# Patient Record
Sex: MALE | Race: ASIAN | NOT HISPANIC OR LATINO | URBAN - METROPOLITAN AREA
[De-identification: names, ages, dates, MRNs, and addresses within clinical notes are randomized per-mention and may not be internally consistent; named-entity substitution may affect disease eponyms.]

---

## 2022-06-10 ENCOUNTER — EMERGENCY (EMERGENCY)
Facility: HOSPITAL | Age: 22
LOS: 1 days | Discharge: HOME | End: 2022-06-10
Attending: EMERGENCY MEDICINE | Admitting: EMERGENCY MEDICINE
Payer: COMMERCIAL

## 2022-06-10 VITALS
OXYGEN SATURATION: 100 % | SYSTOLIC BLOOD PRESSURE: 102 MMHG | RESPIRATION RATE: 16 BRPM | TEMPERATURE: 98 F | DIASTOLIC BLOOD PRESSURE: 62 MMHG | HEART RATE: 71 BPM

## 2022-06-10 VITALS
RESPIRATION RATE: 18 BRPM | SYSTOLIC BLOOD PRESSURE: 108 MMHG | TEMPERATURE: 97 F | OXYGEN SATURATION: 100 % | DIASTOLIC BLOOD PRESSURE: 63 MMHG | HEART RATE: 70 BPM

## 2022-06-10 DIAGNOSIS — F32.A DEPRESSION, UNSPECIFIED: ICD-10-CM

## 2022-06-10 LAB
ALBUMIN SERPL ELPH-MCNC: 5.1 G/DL — HIGH (ref 3.3–5)
ALP SERPL-CCNC: 63 U/L — SIGNIFICANT CHANGE UP (ref 60–270)
ALT FLD-CCNC: 21 U/L — SIGNIFICANT CHANGE UP (ref 4–41)
ANION GAP SERPL CALC-SCNC: 12 MMOL/L — SIGNIFICANT CHANGE UP (ref 7–14)
APPEARANCE UR: CLEAR — SIGNIFICANT CHANGE UP
AST SERPL-CCNC: 15 U/L — SIGNIFICANT CHANGE UP (ref 4–40)
B PERT DNA SPEC QL NAA+PROBE: SIGNIFICANT CHANGE UP
B PERT+PARAPERT DNA PNL SPEC NAA+PROBE: SIGNIFICANT CHANGE UP
BASOPHILS # BLD AUTO: 0.05 K/UL — SIGNIFICANT CHANGE UP (ref 0–0.2)
BASOPHILS NFR BLD AUTO: 0.9 % — SIGNIFICANT CHANGE UP (ref 0–2)
BILIRUB SERPL-MCNC: 0.6 MG/DL — SIGNIFICANT CHANGE UP (ref 0.2–1.2)
BILIRUB UR-MCNC: NEGATIVE — SIGNIFICANT CHANGE UP
BORDETELLA PARAPERTUSSIS (RAPRVP): SIGNIFICANT CHANGE UP
BUN SERPL-MCNC: 17 MG/DL — SIGNIFICANT CHANGE UP (ref 7–23)
C PNEUM DNA SPEC QL NAA+PROBE: SIGNIFICANT CHANGE UP
CALCIUM SERPL-MCNC: 9.6 MG/DL — SIGNIFICANT CHANGE UP (ref 8.4–10.5)
CHLORIDE SERPL-SCNC: 102 MMOL/L — SIGNIFICANT CHANGE UP (ref 98–107)
CO2 SERPL-SCNC: 27 MMOL/L — SIGNIFICANT CHANGE UP (ref 22–31)
COLOR SPEC: YELLOW — SIGNIFICANT CHANGE UP
CREAT SERPL-MCNC: 0.77 MG/DL — SIGNIFICANT CHANGE UP (ref 0.5–1.3)
DIFF PNL FLD: NEGATIVE — SIGNIFICANT CHANGE UP
EGFR: 132 ML/MIN/1.73M2 — SIGNIFICANT CHANGE UP
EOSINOPHIL # BLD AUTO: 0.08 K/UL — SIGNIFICANT CHANGE UP (ref 0–0.5)
EOSINOPHIL NFR BLD AUTO: 1.4 % — SIGNIFICANT CHANGE UP (ref 0–6)
FLUAV SUBTYP SPEC NAA+PROBE: SIGNIFICANT CHANGE UP
FLUBV RNA SPEC QL NAA+PROBE: SIGNIFICANT CHANGE UP
GLUCOSE SERPL-MCNC: 90 MG/DL — SIGNIFICANT CHANGE UP (ref 70–99)
GLUCOSE UR QL: NEGATIVE — SIGNIFICANT CHANGE UP
HADV DNA SPEC QL NAA+PROBE: SIGNIFICANT CHANGE UP
HCOV 229E RNA SPEC QL NAA+PROBE: SIGNIFICANT CHANGE UP
HCOV HKU1 RNA SPEC QL NAA+PROBE: SIGNIFICANT CHANGE UP
HCOV NL63 RNA SPEC QL NAA+PROBE: SIGNIFICANT CHANGE UP
HCOV OC43 RNA SPEC QL NAA+PROBE: SIGNIFICANT CHANGE UP
HCT VFR BLD CALC: 49.4 % — SIGNIFICANT CHANGE UP (ref 39–50)
HGB BLD-MCNC: 16.6 G/DL — SIGNIFICANT CHANGE UP (ref 13–17)
HMPV RNA SPEC QL NAA+PROBE: SIGNIFICANT CHANGE UP
HPIV1 RNA SPEC QL NAA+PROBE: SIGNIFICANT CHANGE UP
HPIV2 RNA SPEC QL NAA+PROBE: SIGNIFICANT CHANGE UP
HPIV3 RNA SPEC QL NAA+PROBE: SIGNIFICANT CHANGE UP
HPIV4 RNA SPEC QL NAA+PROBE: SIGNIFICANT CHANGE UP
IANC: 3.27 K/UL — SIGNIFICANT CHANGE UP (ref 1.8–7.4)
IMM GRANULOCYTES NFR BLD AUTO: 0.5 % — SIGNIFICANT CHANGE UP (ref 0–1.5)
KETONES UR-MCNC: NEGATIVE — SIGNIFICANT CHANGE UP
LEUKOCYTE ESTERASE UR-ACNC: NEGATIVE — SIGNIFICANT CHANGE UP
LYMPHOCYTES # BLD AUTO: 1.86 K/UL — SIGNIFICANT CHANGE UP (ref 1–3.3)
LYMPHOCYTES # BLD AUTO: 32.8 % — SIGNIFICANT CHANGE UP (ref 13–44)
M PNEUMO DNA SPEC QL NAA+PROBE: SIGNIFICANT CHANGE UP
MCHC RBC-ENTMCNC: 30.3 PG — SIGNIFICANT CHANGE UP (ref 27–34)
MCHC RBC-ENTMCNC: 33.6 GM/DL — SIGNIFICANT CHANGE UP (ref 32–36)
MCV RBC AUTO: 90.1 FL — SIGNIFICANT CHANGE UP (ref 80–100)
MONOCYTES # BLD AUTO: 0.38 K/UL — SIGNIFICANT CHANGE UP (ref 0–0.9)
MONOCYTES NFR BLD AUTO: 6.7 % — SIGNIFICANT CHANGE UP (ref 2–14)
NEUTROPHILS # BLD AUTO: 3.27 K/UL — SIGNIFICANT CHANGE UP (ref 1.8–7.4)
NEUTROPHILS NFR BLD AUTO: 57.7 % — SIGNIFICANT CHANGE UP (ref 43–77)
NITRITE UR-MCNC: NEGATIVE — SIGNIFICANT CHANGE UP
NRBC # BLD: 0 /100 WBCS — SIGNIFICANT CHANGE UP
NRBC # FLD: 0 K/UL — SIGNIFICANT CHANGE UP
PCP SPEC-MCNC: SIGNIFICANT CHANGE UP
PH UR: 6 — SIGNIFICANT CHANGE UP (ref 5–8)
PLATELET # BLD AUTO: 296 K/UL — SIGNIFICANT CHANGE UP (ref 150–400)
POTASSIUM SERPL-MCNC: 3.8 MMOL/L — SIGNIFICANT CHANGE UP (ref 3.5–5.3)
POTASSIUM SERPL-SCNC: 3.8 MMOL/L — SIGNIFICANT CHANGE UP (ref 3.5–5.3)
PROT SERPL-MCNC: 7.5 G/DL — SIGNIFICANT CHANGE UP (ref 6–8.3)
PROT UR-MCNC: ABNORMAL
RAPID RVP RESULT: SIGNIFICANT CHANGE UP
RBC # BLD: 5.48 M/UL — SIGNIFICANT CHANGE UP (ref 4.2–5.8)
RBC # FLD: 11.9 % — SIGNIFICANT CHANGE UP (ref 10.3–14.5)
RSV RNA SPEC QL NAA+PROBE: SIGNIFICANT CHANGE UP
RV+EV RNA SPEC QL NAA+PROBE: SIGNIFICANT CHANGE UP
SARS-COV-2 RNA SPEC QL NAA+PROBE: SIGNIFICANT CHANGE UP
SODIUM SERPL-SCNC: 141 MMOL/L — SIGNIFICANT CHANGE UP (ref 135–145)
SP GR SPEC: 1.03 — SIGNIFICANT CHANGE UP (ref 1–1.05)
TOXICOLOGY SCREEN, DRUGS OF ABUSE, SERUM RESULT: SIGNIFICANT CHANGE UP
TSH SERPL-MCNC: 1.95 UIU/ML — SIGNIFICANT CHANGE UP (ref 0.5–4.3)
UROBILINOGEN FLD QL: SIGNIFICANT CHANGE UP
WBC # BLD: 5.67 K/UL — SIGNIFICANT CHANGE UP (ref 3.8–10.5)
WBC # FLD AUTO: 5.67 K/UL — SIGNIFICANT CHANGE UP (ref 3.8–10.5)

## 2022-06-10 PROCEDURE — 99284 EMERGENCY DEPT VISIT MOD MDM: CPT | Mod: GC

## 2022-06-10 PROCEDURE — 93010 ELECTROCARDIOGRAM REPORT: CPT

## 2022-06-10 PROCEDURE — 99284 EMERGENCY DEPT VISIT MOD MDM: CPT | Mod: 25

## 2022-06-10 NOTE — ED ADULT TRIAGE NOTE - CHIEF COMPLAINT QUOTE
has had SI since yesterday as per mobile crisis unit pt states " my heart hurts" pt denies any  was using ketamine, ice and ecstasy  in the past approx 6 months ago has since stopped was" feeling like jumping off a building" was hearing voices 6 months ago denies any currently ems assisted with mandarin translation

## 2022-06-10 NOTE — ED PROVIDER NOTE - OBJECTIVE STATEMENT
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 Choudhury: Interviewed with Mandarin  Romina (#[id number]), with information gathered from mobile crisis team accompanying pt to ED.    Pt is a 21 year old male with unclear past medical or psychiatric history.  He states that he has not seen a doctor in some time and takes no medications.  He denies having a psychiatrist.  Per mobile crisis team, pt has history of being in a partial program and has inpatient psychiatric admissions (last time 1 year ago per pt), but unclear of psychiatric diagnosis.  EMS reports that pt states he previously used multiple illicit substances, but denies use in the last 6 months.  Mobile crisis team was activated by mother due to recent report of pt hallucinating, and then today agitated.  When asked why pt is in the ED today, he states, "I don't want to be here," but then states, "I've been depressed since the last time I left the hospital".  He denies SI/HI, and denies any AVH.  He did note previously feeling "pain around my heart" but states that he has no pain currently or recently. Dr. Choudhury: Interviewed with Mandarin  Romina (#[id number]), with information gathered from mobile crisis team accompanying pt to ED.    Pt is a 21 year old male with unclear past medical or psychiatric history.  He states that he has not seen a doctor in some time and takes no medications.  He denies having a psychiatrist.  Per mobile crisis team, pt has history of being in a partial program and has inpatient psychiatric admissions (last time 1 year ago per pt), but unclear of psychiatric diagnosis.  EMS reports that pt states he previously used multiple illicit substances, but denies use in the last 6 months.  Mobile crisis team was activated by mother due to recent report of pt hallucinating, and then today agitated.  When asked why pt is in the ED today, he states, "I don't want to be here," but then states, "I've been depressed since the last time I left the hospital".  He denies SI/HI, and denies any AVH.  He did note previously feeling "pain around my heart" but states that he has no pain currently or recently.    No prior visits in [place].

## 2022-06-10 NOTE — ED BEHAVIORAL HEALTH ASSESSMENT NOTE - HPI (INCLUDE ILLNESS QUALITY, SEVERITY, DURATION, TIMING, CONTEXT, MODIFYING FACTORS, ASSOCIATED SIGNS AND SYMPTOMS)
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     On exam, Pt is calm and intermittently irritable, insists he is doing okay, and not sure why his mother activated the mobile crisis team. Admits mood is somewhat "depressed" in setting of psychosocial stressors of his mother not trusting that he remains sober, not enjoying his work, and maintaining sobriety these past 6 months. Pt reports he has difficulty sleeping, difficulty with focusing and recalling things. He notes that when he tried to cut back on meth on his own, it was very painful, he often felt "furious" and lashed out verbally at others. He believes this has gotten better. Reports hx of severe paranoia, persecutory beliefs that someone was trying to stab him, and auditory hallucinations while using meth. AH was most severe in Jan 2021, worse with stressors, denies any command AH. Currently Pt reports paranoia/persecutory delusion resolved, and AH stopped in mid-2021.   Pt reports he first used marijuana in Oct 2020, first used ketamine with crystal meth in Dec 2020 with his friends. Pt admits he tried alcohol but stopped when he used meth . In the ED, UTox was negative.  Pt admits to hx of suicidal thoughts when he was frustrated, with various ideas (jumping from building, hit by car), also admits to hx of aggressive thoughts. He reports SI/HI resolved after he spoke with someone close to him, last had these thoughts in early 2022 when he stopped using  meth. Today he denies any suicidal or homicidal ideation, intent, or plan. Provided psychoeducation on [facility] to connect with outpatient services. Pt is currently not interested in  finding a psychiatrist or psychologist.    Collateral information obtained from mother (see separate note), briefly, mom reports concerns with Pt's substance use, hx of paranoia and AH in setting of intoxication. Pt has expressed SI in the past while frustrated. No SI/HI recently, no acute safety concerns. Collateral information from mobile crisis team reviewed in separate note, corroborated Pt's irritability and impulsivity. 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    On exam, Pt is calm and intermittently irritable, insists he is doing okay, and not sure why his mother activated the mobile crisis team. Admits mood is somewhat "depressed" in setting of psychosocial stressors of his mother not trusting that he remains sober, not enjoying his work, and maintaining sobriety these past 6 months. Pt reports he has difficulty sleeping, difficulty with focusing and recalling things. He notes that when he tried to cut back on meth on his own, it was very painful, he often felt "furious" and lashed out verbally at others. He believes this has gotten better. Reports hx of severe paranoia, persecutory beliefs that someone was trying to stab him, and auditory hallucinations while using meth. AH was most severe in Jan 2021, worse with stressors, denies any command AH. Currently Pt reports paranoia/persecutory delusion resolved, and AH stopped in mid-2021.   Pt reports he first used marijuana in Oct 2020, first used ketamine with crystal meth in Dec 2020 with his friends. Pt admits he tried alcohol but stopped when he used meth . In the ED, UTox was negative.  Pt admits to hx of suicidal thoughts when he was frustrated, with various ideas (jumping from building, hit by car), also admits to hx of aggressive thoughts. He reports SI/HI resolved after he spoke with someone close to him, last had these thoughts in early 2022 when he stopped using  meth. Today he denies any suicidal or homicidal ideation, intent, or plan. Provided psychoeducation on [facility] to connect with outpatient services. Pt is currently not interested in finding a psychiatrist or psychologist.    Collateral information obtained from mother (see separate note), briefly, mom reports concerns with Pt's substance use, hx of paranoia and AH in setting of intoxication. Pt has expressed SI in the past while frustrated. No SI/HI recently, no physical aggression, no acute safety concerns. Collateral information from mobile crisis team reviewed in separate note, corroborated Pt's irritability and impulsivity.

## 2022-06-10 NOTE — ED BEHAVIORAL HEALTH NOTE - BEHAVIORAL HEALTH NOTE
Writer interviewed mother with Mandarin Chinese  (ID [id number]). For the past 1-2 years, mom reports Pt has become more argumentative (with mother and coworkers), paranoid, easily stressed. Mom believes Pt has been using more substances (ketamine, meth, ectasy) with friends since college switched to virtual classes in Aug 2020. Pt dropped out of school, and went to work instead. Mom noticed Pt believed people around him were trying to harm him. Mom believes Pt has AH of people saying they want to kill him. Mom is unclear if this only happens while Pt is intoxicated, states this is not happening all the time. As paranoid thoughts about work intensified, mom wanted him to see psychologist yesterday, but Pt refused and verbally cursed at mother. Mom also asked Pt to stay at home until his health improves, mom tried to cut him off financially to stop drug use. Pt went to look for old friends for drugs instead. Pt has verbalized SI in context of frustration when Pt sought jobs during Jan-March 2022, statements ceased after Pt found job. Believes Pt expressed some self-injurious statements to Pt's uncle while venting about work. Denies any hx of SIB. Mother denies Pt made any homicidal statements, states Pt has no hx of aggression or violence towards mother or others.    PPH - no prior inpatient psychiatric admission. Pt saw outpatient psychologist when he first moved to [place] to [facility]. Currently not in outpatient treatment. No prior med trials, and no psychiatrist.     SH - lives with mother, maternal grandfather. Not currently enrolled in school. 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 Moved to [place] from [place] in 2013 (at 14yo).

## 2022-06-10 NOTE — ED BEHAVIORAL HEALTH ASSESSMENT NOTE - DETAILS
Updated Pt's mother with Mandarin-speaking phone  [phone]. no suicidality Pt denies any SIIP since early 2022

## 2022-06-10 NOTE — ED BEHAVIORAL HEALTH ASSESSMENT NOTE - DIFFERENTIAL
unspecified depressive disorder, substance-induced mood disorder, substance withdrawal vs intoxication

## 2022-06-10 NOTE — ED BEHAVIORAL HEALTH ASSESSMENT NOTE - OTHER PAST PSYCHIATRIC HISTORY (INCLUDE DETAILS REGARDING ONSET, COURSE OF ILLNESS, INPATIENT/OUTPATIENT TREATMENT)
chart review indicates prior PHP enrollment, incomplete BH evaluation in [facility] ED (Pt left early)

## 2022-06-10 NOTE — ED BEHAVIORAL HEALTH ASSESSMENT NOTE - CASE SUMMARY
21M with substance abuse history presents with mobile crisis activated by mother for agitation. Patient refused to cooperate with mobile crisis evaluation in the home and 911 was activated. In the ED pt is somewhat irritable, expressing his frustration at being brought to hospital. Admits to some mild ongoing depressive symptoms due to stressors, admits to prior suicidal ideation without intent nor any attempts, and admits to prior psychosis in context of methamphetamine use. In the ED today, pt does not endorse symptoms meeting full criteria for a major depressive episode, he is not thought disordered, not internally preoccupied, not observed to respond to internal stimuli. No evidence of maricel. Patient has been calm and cooperative, reporting sobriety, denying suicidal ideation and homicidal ideation. Patient declined admission and based on evaluation along with collateral information, he does not pose an imminent threat of harm to self or others which would require involuntary commitment at this time. Patient was accepting of outpatient resources provided and reported he would consider seeking treatment on his own. Mother informed of disposition and plan, advised mother to call 911 if pt displays any erratic or aggressive behavior, or he expresses suicidal ideation or homicidal ideation.

## 2022-06-10 NOTE — ED BEHAVIORAL HEALTH ASSESSMENT NOTE - DESCRIPTION
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 Immigrated from [place] in 2013 In the ED, Pt has been calm and cooperative, albeit irritable at times.    Vital Signs Last 24 Hrs  T(C): 36.6 (10 Jey 2022 13:41), Max: 36.6 (10 Jey 2022 13:41)  T(F): 97.8 (10 Jey 2022 13:41), Max: 97.8 (10 Jey 2022 13:41)  HR: 71 (10 Jey 2022 13:41) (70 - 71)  BP: 102/62 (10 Jey 2022 13:41) (102/62 - 108/63)  BP(mean): --  RR: 16 (10 Jey 2022 13:41) (16 - 18)  SpO2: 100% (10 Jey 2022 13:41) (100% - 100%) none

## 2022-06-10 NOTE — ED BEHAVIORAL HEALTH NOTE - BEHAVIORAL HEALTH NOTE
Writer contacted Mobile Crisis  spoke to  Jayy and  Monica.  Pt's mother called mobile crisis team yesterday because he was throwing things in the home.  mobile crisis went ot the home, but pt was pretending to be asleep and not responding to them refusing to engage in conversation.  They went back to the home today and pt did the same thing refusing to engage with them and pretending to be asleep.  They were there to evaluate patient for safety and were not able to because patient would not consent or participate in a conversation.  They informed patient's mother they could call 911. Monica states patient's mother went back upstairs to tell patient that the mobile crisis unit could call 911 and she came back downstairs to tell them that when she told patient they could call 911 to remove him from the home patient threatened if they called 911 he would kill her.  Monica states they did not witness the statement, but pt's mother reported it when she came back down stairs. They report patient's mother reported patient has been in PHP in the past but unknown where they do not have specifics.  Monica states patient's mother is overwhelmed and confused and doesn't have details of anything.  She states pt's mother reported patient had gone to [facility] ER 6 months ago and was sent home.  She does not know of any previous psychiatric admissions.  She does not know of a psychiatric diagnosis.

## 2022-06-10 NOTE — ED BEHAVIORAL HEALTH ASSESSMENT NOTE - AXIS III
04/12/18 1042   Behavioral Health   Hallucinations denies / not responding to hallucinations   Thinking intact   Orientation person: oriented;place: oriented;date: oriented   Memory baseline memory   Insight admits / accepts;insight appropriate to situation;insight appropriate to events   Judgement impaired   Eye Contact at examiner   Affect full range affect   Mood depressed;anxious;other (see comments)  (better)   Physical Appearance/Attire appears stated age;attire appropriate to age and situation;neat   Hygiene well groomed   Suicidality other (see comments)  (denied SI)   1. Wish to be Dead No   2. Non-Specific Active Suicidal Thoughts  No   Self Injury other (see comment)  (denied SIB urges)   Elopement (no indicators this shift)   Activity other (see comment)  (visible, quietly social, engaged in programming)   Speech clear;coherent   Psychomotor / Gait balanced;steady   Sleep/Rest/Relaxation   Day/Evening Time Hours up all shift   Safety   Suicidality Status 15   Psycho Education   Type of Intervention 1:1 intervention   Response participates with encouragement   Hours 0.5   Treatment Detail current MH status and aftercare planning   Activities of Daily Living   Hygiene/Grooming independent   Oral Hygiene independent   Dress street clothes;independent   Room Organization independent   Groups   Details all available groups   Behavioral Health Interventions   Depression maintain safety precautions;monitor need to revise level of observation;establish therapeutic relationship;assist with developing and utilizing healthy coping strategies;build upon strengths;monitor confusion, memory loss, decision making ability and reorient / intervene as needed   Social and Therapeutic Interventions (Depression) encourage socialization with peers;encourage participation in therapeutic groups and milieu activities     Jac has been visible and active in the milieu all shift.  He has been quietly social with peers, engaged  in programming, cooperative with unit as well as personal care protocols, and responsive to staff inquiries and interventions.  He remains moderately depressed and anxious but denied SI, SIB urges, etc.  He is currently focused on finalization of his aftercare plans to include residential CD treatment.   Cash Archer   04/12/2018   none

## 2022-06-10 NOTE — ED PROVIDER NOTE - CLINICAL SUMMARY MEDICAL DECISION MAKING FREE TEXT BOX
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  Plan to check labs and have pt evaluated by psychiatry team.

## 2022-06-10 NOTE — ED ADULT NURSE NOTE - OBJECTIVE STATEMENT
Received pt in  pt calm denies si/hi presently pt awaiting psych safety & comfort measures maintained eval on going.

## 2022-06-10 NOTE — ED BEHAVIORAL HEALTH ASSESSMENT NOTE - RISK ASSESSMENT
Static risk factors: hx of substance use, male gender  Modifiable risk factors: not in treatment, irritability, impulsivity, poor insight   Protective factors: no prior suicide attempts, no hx of aggression, no legal hx, no prior inpatient admissions, future-oriented Low Acute Suicide Risk

## 2022-06-10 NOTE — ED PROVIDER NOTE - PATIENT PORTAL LINK FT
You can access the FollowMyHealth Patient Portal offered by [facility] by registering at the following website: [email or web address]. By joining [employer]’s FollowMyHealth portal, you will also be able to view your health information using other applications (apps) compatible with our system.

## 2022-06-10 NOTE — ED BEHAVIORAL HEALTH ASSESSMENT NOTE - SUMMARY
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     Pt admits to feeling depressed in setting of multiple psychosocial stressors, but overall is hopeful and future-oriented. He reports difficulty sleeping, difficulty with focusing and recalling things. Of note, Pt states he has abstained from using methamphetamine for the past 6 months, and that prior symptoms of paranoia, persecutory delusions, and auditory hallucinations have resolved as well over 6 months. While he had prior suicidal and aggressive thoughts while self-detoxing, he adamantly denies any SIIP and HIIP today, also for over 6 months. There is a noted discrepancy in Pt's account vs his mother's account of his substance use, however Pt did not exhibit any overt signs of intoxication in the ED and had a negative UTox screening. Pt is not at imminent risk of harm to self or others and is appropriate for outpatient services. Provided psychoeducation on [facility] and [facility] Services. Updated Pt's mother with Mandarin-speaking phone  [phone].

## 2022-06-11 LAB
COVID-19 SPIKE DOMAIN AB INTERP: POSITIVE
COVID-19 SPIKE DOMAIN ANTIBODY RESULT: >250 U/ML — HIGH
SARS-COV-2 IGG+IGM SERPL QL IA: >250 U/ML — HIGH
SARS-COV-2 IGG+IGM SERPL QL IA: POSITIVE